# Patient Record
Sex: MALE | Race: BLACK OR AFRICAN AMERICAN | URBAN - METROPOLITAN AREA
[De-identification: names, ages, dates, MRNs, and addresses within clinical notes are randomized per-mention and may not be internally consistent; named-entity substitution may affect disease eponyms.]

---

## 2022-03-24 PROBLEM — Z87.891 FORMER SMOKER: Status: ACTIVE | Noted: 2022-03-21

## 2022-03-24 PROBLEM — R07.89 ATYPICAL CHEST PAIN: Status: ACTIVE | Noted: 2022-03-21

## 2022-10-20 ENCOUNTER — HOSPITAL ENCOUNTER (OUTPATIENT)
Dept: GENERAL RADIOLOGY | Age: 42
Discharge: HOME | End: 2022-10-23

## 2022-10-20 DIAGNOSIS — T14.90XA INJURY: ICD-10-CM

## 2022-10-20 PROCEDURE — 72040 X-RAY EXAM NECK SPINE 2-3 VW: CPT

## 2023-07-11 ENCOUNTER — HOSPITAL ENCOUNTER (EMERGENCY)
Age: 43
Discharge: HOME | End: 2023-07-12
Attending: STUDENT IN AN ORGANIZED HEALTH CARE EDUCATION/TRAINING PROGRAM
Payer: COMMERCIAL

## 2023-07-11 DIAGNOSIS — H57.89 IRRITATION OF RIGHT EYE: Primary | ICD-10-CM

## 2023-07-11 PROCEDURE — 99283 EMERGENCY DEPT VISIT LOW MDM: CPT

## 2023-07-11 RX ORDER — TETRACAINE HYDROCHLORIDE 5 MG/ML
1 SOLUTION OPHTHALMIC ONCE
Status: COMPLETED | OUTPATIENT
Start: 2023-07-11 | End: 2023-07-12

## 2023-07-11 RX ORDER — ERYTHROMYCIN 5 MG/G
OINTMENT OPHTHALMIC
Qty: 1 G | Refills: 0 | Status: SHIPPED | OUTPATIENT
Start: 2023-07-11

## 2023-07-11 RX ORDER — CLINDAMYCIN HYDROCHLORIDE 300 MG/1
300 CAPSULE ORAL 3 TIMES DAILY
Qty: 15 CAPSULE | Refills: 0 | Status: SHIPPED | OUTPATIENT
Start: 2023-07-11 | End: 2023-07-16

## 2023-07-11 ASSESSMENT — VISUAL ACUITY
OS: 20/20
OU: 20/20
OD: 20/20

## 2023-07-11 ASSESSMENT — PAIN DESCRIPTION - LOCATION: LOCATION: EYE

## 2023-07-11 ASSESSMENT — PAIN - FUNCTIONAL ASSESSMENT: PAIN_FUNCTIONAL_ASSESSMENT: 0-10

## 2023-07-11 ASSESSMENT — PAIN SCALES - GENERAL: PAINLEVEL_OUTOF10: 6

## 2023-07-11 ASSESSMENT — PAIN DESCRIPTION - ORIENTATION: ORIENTATION: RIGHT

## 2023-07-12 VITALS
HEART RATE: 75 BPM | SYSTOLIC BLOOD PRESSURE: 134 MMHG | OXYGEN SATURATION: 99 % | WEIGHT: 190 LBS | TEMPERATURE: 97.9 F | HEIGHT: 63 IN | BODY MASS INDEX: 33.66 KG/M2 | DIASTOLIC BLOOD PRESSURE: 82 MMHG | RESPIRATION RATE: 16 BRPM

## 2023-07-12 PROCEDURE — 6370000000 HC RX 637 (ALT 250 FOR IP): Performed by: STUDENT IN AN ORGANIZED HEALTH CARE EDUCATION/TRAINING PROGRAM

## 2023-07-12 RX ADMIN — TETRACAINE HYDROCHLORIDE 1 DROP: 5 SOLUTION OPHTHALMIC at 00:26

## 2023-07-12 RX ADMIN — FLUORESCEIN SODIUM 1 MG: 1 STRIP OPHTHALMIC at 00:27

## 2023-07-12 ASSESSMENT — VISUAL ACUITY: OU: 1

## 2023-07-12 NOTE — ED PROVIDER NOTES
Emergency Department Provider Note       PCP: PROVIDER UNKNOWN   Age: 44 y.o. Sex: male     DISPOSITION Decision To Discharge 07/11/2023 11:56:15 PM       ICD-10-CM    1. Irritation of right eye  H57.89           Medical Decision Making     Complexity of Problems Addressed:  Complexity of Problem: 1 acute, uncomplicated illness or injury. Data Reviewed and Analyzed:  Category 1:   I independently ordered and reviewed each unique test.  I reviewed external records: provider visit note from PCP. The patients assessment required an independent historian: significant other. The reason they were needed is important historical information not provided by the patient. Category 2:       Category 3: Discussion of management or test interpretation. 62-year-old male presents Emergency department with irritation to his right eye began 2 days ago. Thinks he may have injured it or scratched it while sleeping. Denies any known trauma. Denies any vision changes. Does not wear contacts. No headaches or pain with eye movement. Reports irritation on the right lateral aspect of his eye. I did numb the eye, after numbing it and doing it evaluation there is no pain to the eyeball itself. No evidence of conjunctivitis or irritation on the eyeball itself, his discomfort is at the lateral aspect of the eyelid. No obvious stye at this time. The eyelid is very inflamed. Will cover with oral as well as ophthalmic antibiotics. Gave outpatient follow-up and strict return precautions. He voiced understanding and agreement. Risk of Complications and/or Morbidity of Patient Management:  Prescription drug management performed    History     Palma Phipps is a 44 y.o. male who presents to the Emergency Department with chief complaint of    Chief Complaint   Patient presents with    Foreign Body in Eye      62-year-old male presents Emergency department with irritation to his right eye began 2 days ago.   Thinks he may

## 2023-07-12 NOTE — DISCHARGE INSTRUCTIONS
Take antibiotics as prescribed. Follow-up with optometry return to the ER as needed for worsening or worrisome symptoms.
